# Patient Record
Sex: FEMALE | Race: BLACK OR AFRICAN AMERICAN | NOT HISPANIC OR LATINO
[De-identification: names, ages, dates, MRNs, and addresses within clinical notes are randomized per-mention and may not be internally consistent; named-entity substitution may affect disease eponyms.]

---

## 2020-01-06 ENCOUNTER — RECORDS - HEALTHEAST (OUTPATIENT)
Dept: ADMINISTRATIVE | Facility: OTHER | Age: 45
End: 2020-01-06

## 2020-01-07 ENCOUNTER — AMBULATORY - HEALTHEAST (OUTPATIENT)
Dept: ADMINISTRATIVE | Facility: CLINIC | Age: 45
End: 2020-01-07

## 2020-01-07 DIAGNOSIS — K43.9 VENTRAL HERNIA WITHOUT OBSTRUCTION OR GANGRENE: ICD-10-CM

## 2020-01-08 ENCOUNTER — SURGERY - HEALTHEAST (OUTPATIENT)
Dept: SURGERY | Facility: CLINIC | Age: 45
End: 2020-01-08

## 2020-01-08 ENCOUNTER — OFFICE VISIT - HEALTHEAST (OUTPATIENT)
Dept: SURGERY | Facility: CLINIC | Age: 45
End: 2020-01-08

## 2020-01-08 DIAGNOSIS — K43.9 VENTRAL HERNIA WITHOUT OBSTRUCTION OR GANGRENE: ICD-10-CM

## 2020-01-08 RX ORDER — LISINOPRIL AND HYDROCHLOROTHIAZIDE 20; 25 MG/1; MG/1
1 TABLET ORAL DAILY
Status: SHIPPED | COMMUNITY
Start: 2019-08-01

## 2020-01-08 RX ORDER — AMLODIPINE BESYLATE 10 MG/1
10 TABLET ORAL DAILY
Status: SHIPPED | COMMUNITY
Start: 2019-09-23

## 2020-01-17 ENCOUNTER — RECORDS - HEALTHEAST (OUTPATIENT)
Dept: ADMINISTRATIVE | Facility: OTHER | Age: 45
End: 2020-01-17

## 2020-01-21 ENCOUNTER — ANESTHESIA - HEALTHEAST (OUTPATIENT)
Dept: SURGERY | Facility: HOSPITAL | Age: 45
End: 2020-01-21

## 2020-01-21 ENCOUNTER — SURGERY - HEALTHEAST (OUTPATIENT)
Dept: SURGERY | Facility: HOSPITAL | Age: 45
End: 2020-01-21

## 2020-01-21 ASSESSMENT — MIFFLIN-ST. JEOR: SCORE: 1152.99

## 2020-02-03 ENCOUNTER — OFFICE VISIT - HEALTHEAST (OUTPATIENT)
Dept: SURGERY | Facility: CLINIC | Age: 45
End: 2020-02-03

## 2020-02-03 DIAGNOSIS — Z87.19 S/P VENTRAL HERNIORRHAPHY: ICD-10-CM

## 2020-02-03 DIAGNOSIS — Z98.890 S/P VENTRAL HERNIORRHAPHY: ICD-10-CM

## 2020-02-05 ENCOUNTER — AMBULATORY - HEALTHEAST (OUTPATIENT)
Dept: SURGERY | Facility: CLINIC | Age: 45
End: 2020-02-05

## 2020-02-10 ENCOUNTER — COMMUNICATION - HEALTHEAST (OUTPATIENT)
Dept: SURGERY | Facility: CLINIC | Age: 45
End: 2020-02-10

## 2020-02-12 ENCOUNTER — OFFICE VISIT - HEALTHEAST (OUTPATIENT)
Dept: SURGERY | Facility: CLINIC | Age: 45
End: 2020-02-12

## 2020-02-12 ENCOUNTER — COMMUNICATION - HEALTHEAST (OUTPATIENT)
Dept: SURGERY | Facility: CLINIC | Age: 45
End: 2020-02-12

## 2020-02-12 ENCOUNTER — AMBULATORY - HEALTHEAST (OUTPATIENT)
Dept: SURGERY | Facility: CLINIC | Age: 45
End: 2020-02-12

## 2020-02-12 DIAGNOSIS — K43.9 VENTRAL HERNIA WITHOUT OBSTRUCTION OR GANGRENE: ICD-10-CM

## 2020-02-19 ENCOUNTER — COMMUNICATION - HEALTHEAST (OUTPATIENT)
Dept: SURGERY | Facility: CLINIC | Age: 45
End: 2020-02-19

## 2020-02-25 ENCOUNTER — COMMUNICATION - HEALTHEAST (OUTPATIENT)
Dept: SURGERY | Facility: CLINIC | Age: 45
End: 2020-02-25

## 2020-03-04 ENCOUNTER — COMMUNICATION - HEALTHEAST (OUTPATIENT)
Dept: SURGERY | Facility: CLINIC | Age: 45
End: 2020-03-04

## 2021-06-04 VITALS
OXYGEN SATURATION: 100 % | SYSTOLIC BLOOD PRESSURE: 132 MMHG | WEIGHT: 135 LBS | HEART RATE: 79 BPM | DIASTOLIC BLOOD PRESSURE: 74 MMHG

## 2021-06-04 VITALS
WEIGHT: 134.5 LBS | DIASTOLIC BLOOD PRESSURE: 70 MMHG | OXYGEN SATURATION: 100 % | BODY MASS INDEX: 27.17 KG/M2 | SYSTOLIC BLOOD PRESSURE: 126 MMHG | HEART RATE: 102 BPM

## 2021-06-04 VITALS — BODY MASS INDEX: 27.21 KG/M2 | WEIGHT: 135 LBS | HEIGHT: 59 IN

## 2021-06-05 NOTE — PROGRESS NOTES
HPI: Pt is here for follow up s/p VHR with Dr. Rose on 1/21/20.   she is doing well.  Pain is well controlled:  Yes. No difficulties with the surgical wound/wounds.  she is eating well and denies fever and chills.        There were no vitals taken for this visit.    EXAM:  GENERAL:Appears well  ABDOMEN:  Soft, +BS, mild incision tenderness  SURGICAL WOUNDS:  Incisions healing well, no induration or drainage.      Assessment/Plan: Doing well after surgery and should follow up as needed.    Will continue with lifting restrictions for 2 more weeks.    Johnson Lam PA-C  [phone]  General Surgery

## 2021-06-05 NOTE — TELEPHONE ENCOUNTER
Pts Care Provider: Dr. Ellsworth    Caller: Sherri    Phone Number: [phone]  OK to leave message: Yes    Reason for Call:Pt called and left a message for a nurse to call her back and discuss questions she has following her surgery and post op appt   Please call patient to discuss

## 2021-06-05 NOTE — ANESTHESIA POSTPROCEDURE EVALUATION
Patient: Sherri Jj  ROBOTIC VENTRAL HERNIA REPAIR  Anesthesia type: general    Patient location: Phase II Recovery  Last vitals:   Vitals Value Taken Time   /78 1/21/2020  3:00 PM   Temp 37.2  C (98.9  F) 1/21/2020  2:05 PM   Pulse 96 1/21/2020  3:01 PM   Resp 17 1/21/2020  3:01 PM   SpO2 93 % 1/21/2020  3:01 PM   Vitals shown include unvalidated device data.  Post vital signs: stable  Level of consciousness: awake and responds to simple questions  Post-anesthesia pain: pain controlled  Post-anesthesia nausea and vomiting: no  Pulmonary: unassisted, return to baseline  Cardiovascular: stable and blood pressure at baseline  Hydration: adequate  Anesthetic events: no    QCDR Measures:  ASA# 11 - Linnea-op Cardiac Arrest: ASA11B - Patient did NOT experience unanticipated cardiac arrest  ASA# 12 - Linnea-op Mortality Rate: ASA12B - Patient did NOT die  ASA# 13 - PACU Re-Intubation Rate: ASA13B - Patient did NOT require a new airway mgmt  ASA# 10 - Composite Anes Safety: ASA10A - No serious adverse event    Additional Notes:

## 2021-06-05 NOTE — PROGRESS NOTES
Surgical Consult from Leo New MD  About a ventral hernia    HPI:  This is a 44 y.o. female here today with concerns of pain and bulging in the epigastrium . she has noted this for about the past 2 weeks.  It has been getting larger and increasingly symptomatic. she has not had any significant GI symptoms.      Allergies:Patient has no known allergies.    Past Medical History:   Diagnosis Date     Hypertension        Past Surgical History:   Procedure Laterality Date      SECTION, CLASSIC         CURRENT MEDS:      History reviewed. No pertinent family history.     reports that she has quit smoking. She has never used smokeless tobacco. She reports previous alcohol use. She reports that she does not use drugs.    Review of Systems  12 point review of systems is unremarkable, except as mentioned above;      EXAM:  This is a .age .sex in no distress.  /74 (Patient Site: Right Arm, Patient Position: Sitting, Cuff Size: Adult Regular)   Pulse 79   Wt 135 lb (61.2 kg)   SpO2 100%   GENERAL: Well developed female   CARDIAC: RRR w/out murmur  CHEST/LUNG: Clear to ascultation, No wheezes  ABDOMEN: Soft with, +Bowel Sounds, a golf ball sized hernia I the upper mid abdomin that is tender to palpation.  BACK: No CVA tenderness,   NEURO:No focal deficits, ambulatory  LYMPH: No Axillary or inguinal Adenopathy  EXTREMITIES: Ambulatory, No lower extremity edema    IMAGES:  None    Assessment/Plan: Pt with a upper midline ventral hernia. I discussed this at length with her.  I went over conservative management as well as surgical treatment of this. I would plan on doing this laparoscopically understanding the possibility of converting to an open operation. I would plan on doing this via an open approach. I went over the small risks of surgery including but not limited to bleeding and infection. I discussed the expected recovery time as well. Will get this scheduled. she will contact us to have this  scheduled.     Wesley Rose MD  [facility] Department of Surgery

## 2021-06-05 NOTE — ANESTHESIA CARE TRANSFER NOTE
Last vitals:   Vitals:    01/21/20 0951   BP: 167/86   Pulse: 95   Resp: 16   Temp: 37.1  C (98.8  F)   SpO2: 98%     Patient's level of consciousness is unresponsive  Spontaneous respirations: yes  Maintains airway independently: yes  Dentition unchanged: yes  Oropharynx: oropharynx clear of all foreign objects    QCDR Measures:  ASA# 20 - Surgical Safety Checklist: WHO surgical safety checklist completed prior to induction    PQRS# 430 - Adult PONV Prevention: 4558F - Pt received => 2 anti-emetic agents (different classes) preop & intraop  ASA# 8 - Peds PONV Prevention: NA - Not pediatric patient, not GA or 2 or more risk factors NOT present  PQRS# 424 - Linnea-op Temp Management: 4559F - At least one body temp DOCUMENTED => 35.5C or 95.9F within required timeframe  PQRS# 426 - PACU Transfer Protocol: - Transfer of care checklist used  ASA# 14 - Acute Post-op Pain: ASA14B - Patient did NOT experience pain >= 7 out of 10

## 2021-06-06 NOTE — PROGRESS NOTES
HPI: Pt is here for follow up of a ventral hernia repair.   she is doing well.  Pain is well controlled.  No difficulties with the surgical wound/wounds.  she is eating well and denies fever and chills.         There were no vitals taken for this visit.    EXAM:  GENERAL:Appears well  ABDOMEN:  Soft, +BS  SURGICAL WOUNDS:  Incisions healing well, no enduration or drainage.    Assessment/Plan: . Doing well after surgery and should follow up as needed.  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  The first week of March she should be OK to restart her work.    Wesley Rose MD  [facility] Department of Surgery      This is a copy of the patient's operative note:  Operative Report:    Patient is brought the operating room placed in the supine position given general endotracheal anesthesia sterilely prepped and draped in the usual surgical fashion.  A timeout process was undertaken.     A 5 mm trochars advanced in the right upper quadrant of the abdomen under direct visualization of the laparoscope using the Optiview technique.  Pneumoperitoneum is brought up to 15 mmHg.  Placed a 12 mm trocar in the right middle region and on the right lower abdomen I placed an 8 mm trocar for the robot.  We then came back and replaced the 5 mm trocar with an 8 mm trocar.  I did put a small rent in the liver with the entry of the original trocar and address that with electrocautery and aspirated the small amount of bleeding that had developed from that liver injury.     The robot was docked and the intra-abdominal adhesions were reduced from the hernia defect and all surrounding adhesions were taken down with the robotic with use of cautery.  I then dissected around the perimeter of the hernia defect.   I then closed the fascial defect with a running #0 strata fix self locking barbed suture.  The large ventral X mesh was brought in through the 12 mm trocar.  I had attached a  centralized 0 PDS suture and this was then drawn up to the area where the hernia defect just been closed.       The mesh was then sutured to the abdominal wall with a running 2-0 strata-fix suture around the perimeter of the mesh.      I then used the Endo fascial closure device to close the fascia around the 12 mm trocar site and the lower 8 mm trocar site both under direct visualization utilizing 0 Vicryl suture.  The pneumoperitoneum was allowed to deflate and all skin incisions were closed with 4-0 subcuticular Monocryl sutures

## 2021-06-06 NOTE — TELEPHONE ENCOUNTER
Spoke with patient re:   Returning to work on 2/28/20, original date set for May.  Medical insurance will terminate next Tuesday.      Lauren Elliott LPN  [facility]  General Surgery  P: [phone]  F: [phone]

## 2021-06-06 NOTE — TELEPHONE ENCOUNTER
Resent letter via fax to [phone].  Per patient request.    [facility]  [facility]  P: [phone]  F: [phone]

## 2021-06-06 NOTE — TELEPHONE ENCOUNTER
Per Dr. Seng orona to write RTW letter, including restriction.  No lifting 20 pounds until March 19, 2020.    [facility]  General Surgery  P: [phone]  F: [phone]

## 2021-06-06 NOTE — TELEPHONE ENCOUNTER
Pt is calling to check on the status of her RTW letter. She states she needs it faxed today.     Fax: [phone]    She would also like a call back to discuss the letter.

## 2021-06-16 PROBLEM — K43.9 VENTRAL HERNIA WITHOUT OBSTRUCTION OR GANGRENE: Status: ACTIVE | Noted: 2020-01-09

## 2021-06-20 NOTE — LETTER
Letter by Wesley Rose MD at      Author: Wesley Rose MD Service: -- Author Type: --    Filed:  Encounter Date: 2/25/2020 Status: (Other)         February 25, 2020     Patient: Sherri Jj   YOB: 1975   Date of Visit: 2/25/2020       To Whom It May Concern:    It is my medical opinion that Sherri Jj may return to full duty immediately with no restrictions.    If you have any questions or concerns, please don't hesitate to call.    Sincerely,        Electronically signed by Wesley Rose MD

## 2021-06-20 NOTE — LETTER
Letter by Adi Martines CMA at      Author: Adi Martines CMA Service: -- Author Type: --    Filed:  Encounter Date: 2/12/2020 Status: (Other)         February 12, 2020     Patient: Sherri Jj   YOB: 1975   Date of Visit: 2/12/2020       To Whom It May Concern:    It is my medical opinion that Sherri Jj may return to work on Monday March 2nd 2020..    If you have any questions or concerns, please don't hesitate to call.    Sincerely,        Electronically signed by Adi Martines CMA

## 2021-06-20 NOTE — LETTER
Letter by Lorin Elliott LPN at      Author: Lorin Elliott LPN Service: -- Author Type: --    Filed:  Encounter Date: 2/19/2020 Status: (Other)         February 19, 2020     Patient: Sherri Jj   YOB: 1975   Date of Visit: 2/19/2020       To Whom It May Concern:    It is my medical opinion that Sherri Jj may return to work on 2/24/2020.  No lifting 20 pounds until 3/24/2020.             If you have any questions or concerns, please don't hesitate to call.    Sincerely,        Electronically signed by Wesley Rose MD

## 2021-07-03 NOTE — ANESTHESIA PREPROCEDURE EVALUATION
"Anesthesia Preprocedure Evaluation by Jesse Montaño MD at 1/21/2020  7:32 AM     Author: Jesse Montaño MD Service: -- Author Type: Physician    Filed: 1/21/2020 10:21 AM Date of Service: 1/21/2020  7:32 AM Status: Addendum    : Jesse Montaño MD (Physician)    Related Notes: Original Note by Jesse Montaño MD (Physician) filed at 1/21/2020 10:20 AM       Anesthesia Evaluation      Patient summary reviewed   No history of anesthetic complications     Airway   Mallampati: II   Pulmonary - normal exam   (+) a smoker (recently quit 1 week ago)                         Cardiovascular - normal exam  Exercise tolerance: > or = 4 METS  (+) hypertension, ,     Rhythm: regular  Rate: normal,         Neuro/Psych      Comments: States she had a \"stroke\" 20 years ago. No residual. Symptom was right facial weakness for 3 days. Not mentioned in [facility]    Endo/Other - negative ROS      GI/Hepatic/Renal - negative ROS      Other findings: Hgb=12.9  Lsk=018  Hcg= negative (1/17/2020)  GFR>60  K=3.7      Dental                             Anesthesia Plan  Planned anesthetic: general endotracheal  GAETT  Antiemetics  Patient defers scopolamine  ASA 2   Induction: intravenous   Anesthetic plan and risks discussed with: patient  Anesthesia plan special considerations: antiemetics,   Post-op plan: routine recovery               "